# Patient Record
Sex: FEMALE | Race: BLACK OR AFRICAN AMERICAN | NOT HISPANIC OR LATINO | Employment: UNEMPLOYED | URBAN - NONMETROPOLITAN AREA
[De-identification: names, ages, dates, MRNs, and addresses within clinical notes are randomized per-mention and may not be internally consistent; named-entity substitution may affect disease eponyms.]

---

## 2023-10-08 ENCOUNTER — HOSPITAL ENCOUNTER (EMERGENCY)
Facility: HOSPITAL | Age: 23
Discharge: HOME | End: 2023-10-08
Payer: MEDICAID

## 2023-10-08 VITALS
WEIGHT: 117 LBS | BODY MASS INDEX: 20.73 KG/M2 | SYSTOLIC BLOOD PRESSURE: 120 MMHG | HEART RATE: 105 BPM | HEIGHT: 63 IN | TEMPERATURE: 99 F | RESPIRATION RATE: 18 BRPM | OXYGEN SATURATION: 99 % | DIASTOLIC BLOOD PRESSURE: 65 MMHG

## 2023-10-08 DIAGNOSIS — N76.0 BACTERIAL VAGINOSIS: ICD-10-CM

## 2023-10-08 DIAGNOSIS — B96.89 BACTERIAL VAGINOSIS: ICD-10-CM

## 2023-10-08 DIAGNOSIS — N30.01 ACUTE CYSTITIS WITH HEMATURIA: Primary | ICD-10-CM

## 2023-10-08 LAB
BACTERIA #/AREA URNS HPF: ABNORMAL /HPF
BACTERIA VAG QL WET PREP: ABNORMAL /HPF
BILIRUB UR QL STRIP: NEGATIVE
CLARITY UR: ABNORMAL
CLUE CELLS VAG QL WET PREP: ABNORMAL /HPF
COLOR UR: YELLOW
FLUAV AG UPPER RESP QL IA.RAPID: NEGATIVE
FLUBV AG UPPER RESP QL IA.RAPID: NEGATIVE
GLUCOSE UR STRIP-MCNC: NEGATIVE MG/DL
HCG UR QL IA.RAPID: NEGATIVE
KETONES UR STRIP-SCNC: >=80 MG/DL
LEUKOCYTE ESTERASE UR QL STRIP: NEGATIVE
MUCOUS THREADS #/AREA URNS HPF: ABNORMAL /HPF
NITRITE UR QL STRIP: POSITIVE
PH UR STRIP: 6 PH UNITS
PROT UR QL STRIP: 30
RAPID GROUP A STREP: NEGATIVE
RBC # UR STRIP: ABNORMAL /UL
RBC #/AREA URNS HPF: ABNORMAL /HPF
RBC #/AREA VAG WET PREP: ABNORMAL /HPF
SARS-COV-2 RDRP RESP QL NAA+PROBE: NEGATIVE
SP GR UR STRIP: 1.02
SQUAMOUS #/AREA URNS LPF: ABNORMAL /LPF
SQUAMOUS EPITHELIALS WET WOUNT, GENITAL: ABNORMAL /HPF
T VAGINALIS VAG QL WET PREP: ABNORMAL /HPF
UROBILINOGEN UR STRIP-ACNC: 1 MG/DL
WBC #/AREA URNS HPF: ABNORMAL /HPF
WBC CLUMPS WET MOUNT, GENITAL: ABNORMAL /HPF
WBC VAG QL WET PREP: ABNORMAL /HPF
YEAST VAG QL WET PREP: ABNORMAL /HPF

## 2023-10-08 PROCEDURE — 87077 CULTURE AEROBIC IDENTIFY: CPT | Performed by: REGISTERED NURSE

## 2023-10-08 PROCEDURE — 87186 SC STD MICRODIL/AGAR DIL: CPT | Performed by: REGISTERED NURSE

## 2023-10-08 PROCEDURE — 99284 EMERGENCY DEPT VISIT MOD MDM: CPT

## 2023-10-08 PROCEDURE — 81025 URINE PREGNANCY TEST: CPT | Performed by: REGISTERED NURSE

## 2023-10-08 PROCEDURE — 87880 STREP A ASSAY W/OPTIC: CPT | Performed by: REGISTERED NURSE

## 2023-10-08 PROCEDURE — 87804 INFLUENZA ASSAY W/OPTIC: CPT | Performed by: REGISTERED NURSE

## 2023-10-08 PROCEDURE — 99284 EMERGENCY DEPT VISIT MOD MDM: CPT | Mod: ,,, | Performed by: REGISTERED NURSE

## 2023-10-08 PROCEDURE — 87635 SARS-COV-2 COVID-19 AMP PRB: CPT | Performed by: REGISTERED NURSE

## 2023-10-08 PROCEDURE — 87210 SMEAR WET MOUNT SALINE/INK: CPT | Performed by: REGISTERED NURSE

## 2023-10-08 PROCEDURE — 81001 URINALYSIS AUTO W/SCOPE: CPT | Performed by: REGISTERED NURSE

## 2023-10-08 PROCEDURE — 99284 PR EMERGENCY DEPT VISIT,LEVEL IV: ICD-10-PCS | Mod: ,,, | Performed by: REGISTERED NURSE

## 2023-10-08 RX ORDER — CIPROFLOXACIN 500 MG/1
500 TABLET ORAL EVERY 12 HOURS
Qty: 14 TABLET | Refills: 0 | Status: SHIPPED | OUTPATIENT
Start: 2023-10-08 | End: 2023-10-15

## 2023-10-08 RX ORDER — METRONIDAZOLE 500 MG/1
500 TABLET ORAL 3 TIMES DAILY
Qty: 21 TABLET | Refills: 0 | Status: SHIPPED | OUTPATIENT
Start: 2023-10-08 | End: 2023-10-15

## 2023-10-08 NOTE — ED TRIAGE NOTES
Pt presents with c/o chills, RLQ abdominal pain, headache, and states she feels like she has fever. Took dayquil approx. 1 and 1/2 hours prior to arrival.

## 2023-10-08 NOTE — ED PROVIDER NOTES
"Encounter Date: 10/8/2023       History     Chief Complaint   Patient presents with    Chills    Fever    Abdominal Pain    Dysuria    Headache     Chanell Archer is a 22 yo AAF that presents with 3 day history of chills, subjective fever, headache, and right flank pain.  States she began to have RLQ abdominal pain today.  Pt states the flank pain increases with movement.  She denies dysuria or frequency.  Rates the pain as dull in nature with intermittent sharp pain at times 6/10 on pain scale.  Pt states she took DayQuil 1.5 hrs PTA without relief.  Has had alternating nasal congestion and drainage.  Cough with "green" sputum.    The history is provided by the patient.     Review of patient's allergies indicates:  No Known Allergies  History reviewed. No pertinent past medical history.  History reviewed. No pertinent surgical history.  History reviewed. No pertinent family history.  Social History     Tobacco Use    Smoking status: Every Day     Types: Vaping with nicotine    Smokeless tobacco: Never   Substance Use Topics    Alcohol use: Not Currently    Drug use: Not Currently     Review of Systems   Constitutional:  Positive for chills and fever.   HENT:  Positive for congestion and rhinorrhea. Negative for sore throat.    Respiratory:  Positive for cough. Negative for shortness of breath.    Cardiovascular:  Positive for chest pain.   Gastrointestinal:  Positive for abdominal pain (RLQ). Negative for diarrhea, nausea and vomiting.   Genitourinary:  Positive for flank pain and vaginal discharge (white). Negative for dysuria, frequency and urgency.   Neurological:  Positive for headaches.       Physical Exam     Initial Vitals [10/08/23 1901]   BP Pulse Resp Temp SpO2   120/65 105 18 99.4 °F (37.4 °C) 99 %      MAP       --         Physical Exam    Constitutional: She appears well-developed and well-nourished. She is not diaphoretic. She is active and cooperative.  Non-toxic appearance. She does not have a " sickly appearance. She does not appear ill. No distress.   HENT:   Head: Normocephalic and atraumatic.   Right Ear: External ear normal.   Left Ear: External ear normal.   Nose: Nose normal.   Mouth/Throat: Oropharynx is clear and moist. No oropharyngeal exudate.   Eyes: EOM are normal. Pupils are equal, round, and reactive to light.   Neck: Neck supple.   Normal range of motion.  Cardiovascular:  Regular rhythm, normal heart sounds and intact distal pulses.           Pulmonary/Chest: Breath sounds normal. No respiratory distress.   Abdominal: Abdomen is soft. Bowel sounds are normal. She exhibits no distension.   Musculoskeletal:         General: Normal range of motion.      Cervical back: Normal range of motion and neck supple.     Neurological: She is alert and oriented to person, place, and time. She has normal strength. GCS score is 15. GCS eye subscore is 4. GCS verbal subscore is 5. GCS motor subscore is 6.   Skin: Skin is warm and dry. Capillary refill takes less than 2 seconds.   Psychiatric: She has a normal mood and affect. Her behavior is normal. Judgment and thought content normal.         Medical Screening Exam   See Full Note    ED Course   Procedures  Labs Reviewed   WET PREP, GENITAL - Abnormal; Notable for the following components:       Result Value    WBC Wet [place] Few (*)     Bacteria [place] Few (*)     Clue Cell [place] Moderate (*)     Squamous Epithelials [place] Few (*)     All other components within normal limits   URINALYSIS, REFLEX TO URINE CULTURE - Abnormal; Notable for the following components:    Nitrites, UA Positive (*)     Protein, UA 30 (*)     Ketones, UA >=80 (*)     Blood, UA Moderate (*)     All other components within normal limits   URINALYSIS, MICROSCOPIC - Abnormal; Notable for the following components:    WBC, UA Too Numerous To Count (*)     Bacteria, UA Moderate (*)     Squamous Epithelial Cells, UA Few (*)     Mucus, UA Few (*)     All other components within  "normal limits   THROAT SCREEN, RAPID STREP - Normal   RAPID INFLUENZA A/B - Normal   SARS-COV-2 RNA AMPLIFICATION, QUAL - Normal    Narrative:     Negative SARS-CoV results should not be used as the sole basis for treatment or patient management decisions; negative results should be considered in the context of a patient's recent exposures, history and the presene of clinical signs and symptoms consistent with COVID-19.  Negative results should be treated as presumptive and confirmed by molecular assay, if necessary for patient management.   HCG QUALITATIVE URINE - Normal   CULTURE, URINE          Imaging Results    None          Medications - No data to display  Medical Decision Making  Chanell Archer is a 24 yo AAF that presents with 3 day history of chills, subjective fever, headache, and right flank pain.  States she began to have RLQ abdominal pain today.  Pt states the flank pain increases with movement.  She denies dysuria or frequency.  Rates the pain as dull in nature with intermittent sharp pain at times 6/10 on pain scale.  Pt states she took DayQuil 1.5 hrs PTA without relief.  Has had alternating nasal congestion and drainage.  Cough with "green" sputum.    -Discussed lab findings with pt  -Discussed discharge instructions with pt who verbalized understanding and is in agreement with the plan of care.        Amount and/or Complexity of Data Reviewed  Labs: ordered.     Details: Labs Reviewed  WET PREP, GENITAL - Abnormal; Notable for the following components:     WBC Wet Mount                 Few (*)                Bacteria Wet Mount            Few (*)                Clue Cell Wet Mount           Moderate (*)               Squamous Epithelials Wet Mount   Few (*)             All other components within normal limits  URINALYSIS, REFLEX TO URINE CULTURE - Abnormal; Notable for the following components:     Nitrites, UA                  Positive (*)               Protein, UA                   30 (*)       "           Ketones, UA                   >=80 (*)               Blood, UA                     Moderate (*)            All other components within normal limits  URINALYSIS, MICROSCOPIC - Abnormal; Notable for the following components:     WBC, UA                         (*)                  Bacteria, UA                  Moderate (*)               Squamous Epithelial Cells, UA   Few (*)                Mucus, UA                     Few (*)             All other components within normal limits  THROAT SCREEN, RAPID STREP - Normal  RAPID INFLUENZA A/B - Normal  SARS-COV-2 RNA AMPLIFICATION, QUAL - Normal         Narrative: Negative SARS-CoV results should not be used as the sole basis for treatment or patient management decisions; negative results should be considered in the context of a patient's recent exposures, history and the presene of clinical signs and symptoms consistent with COVID-19.  Negative results should be treated as presumptive and confirmed by molecular assay, if necessary for patient management.  HCG QUALITATIVE URINE - Normal  CULTURE, URINE      Risk  Prescription drug management.                               Clinical Impression:   Final diagnoses:  [N76.0, B96.89] Bacterial vaginosis  [N30.01] Acute cystitis with hematuria (Primary)        ED Disposition Condition    Discharge Stable          ED Prescriptions       Medication Sig Dispense Start Date End Date Auth. Provider    metroNIDAZOLE (FLAGYL) 500 MG tablet Take 1 tablet (500 mg total) by mouth 3 (three) times daily. for 7 days 21 tablet 10/8/2023 10/15/2023 Maryam Honeycutt NP-C    ciprofloxacin HCl (CIPRO) 500 MG tablet Take 1 tablet (500 mg total) by mouth every 12 (twelve) hours. for 7 days 14 tablet 10/8/2023 10/15/2023 Maryam Honeycutt NP-C          Follow-up Information       Follow up With Specialties Details Why Contact Info    Parrish Sarah Jr., NP Critical Care Medicine In 2 days If symptoms worsen or are not improving [facility]  [address] [place] [address]  [phone]      Parrish Sarah Jr., NP Critical Care Medicine  At the completion of your antibiotics to have urine rechecked [address] [place] [address]  [phone]               Maryam Honeycutt, NP-C  10/09/23 0147

## 2023-10-09 NOTE — DISCHARGE INSTRUCTIONS
-Do not drink alcohol while taking Flagyl  -Medication as directed  -Increase water intake  -Follow up with your regular provider at the completion of your antibiotics to have your urine rechecked  -Return to ED as needed

## 2023-10-11 LAB — UA COMPLETE W REFLEX CULTURE PNL UR: ABNORMAL

## 2023-10-16 ENCOUNTER — TELEPHONE (OUTPATIENT)
Dept: EMERGENCY MEDICINE | Facility: HOSPITAL | Age: 23
End: 2023-10-16
Payer: MEDICAID

## 2023-10-16 NOTE — TELEPHONE ENCOUNTER
SPOKE WITH PT MOTHER AND MADE HER AWARE TO COMPLETE ALL MEDS AND FU WITH PCP TO RECHECK URINE.     ----- Message from IVELISSE Cash sent at 10/15/2023 10:44 PM CDT -----  Urine culture grew out e-coli which cipro will cover. Complete Cipro as directed. Follow up with PCP after antibiotic is completed to make sure UTI has resolved.

## 2024-02-04 ENCOUNTER — HOSPITAL ENCOUNTER (EMERGENCY)
Facility: HOSPITAL | Age: 24
Discharge: HOME | End: 2024-02-04

## 2024-02-04 VITALS
OXYGEN SATURATION: 99 % | RESPIRATION RATE: 18 BRPM | DIASTOLIC BLOOD PRESSURE: 72 MMHG | HEART RATE: 89 BPM | WEIGHT: 115 LBS | SYSTOLIC BLOOD PRESSURE: 106 MMHG | TEMPERATURE: 98 F | BODY MASS INDEX: 21.16 KG/M2 | HEIGHT: 62 IN

## 2024-02-04 DIAGNOSIS — S60.10XA SUBUNGUAL HEMATOMA OF DIGIT OF HAND, INITIAL ENCOUNTER: Primary | ICD-10-CM

## 2024-02-04 PROCEDURE — 11740 EVACUATION SUBUNGUAL HMTMA: CPT

## 2024-02-04 PROCEDURE — 25000003 PHARM REV CODE 250: Performed by: REGISTERED NURSE

## 2024-02-04 PROCEDURE — 99283 EMERGENCY DEPT VISIT LOW MDM: CPT | Mod: 25,,, | Performed by: REGISTERED NURSE

## 2024-02-04 PROCEDURE — 11740 EVACUATION SUBUNGUAL HMTMA: CPT | Mod: ,,, | Performed by: REGISTERED NURSE

## 2024-02-04 PROCEDURE — 99283 EMERGENCY DEPT VISIT LOW MDM: CPT | Mod: 25

## 2024-02-04 RX ORDER — ACETAMINOPHEN 325 MG/1
650 TABLET ORAL
Status: COMPLETED | OUTPATIENT
Start: 2024-02-04 | End: 2024-02-04

## 2024-02-04 RX ADMIN — ACETAMINOPHEN 650 MG: 325 TABLET ORAL at 05:02

## 2024-02-04 NOTE — DISCHARGE INSTRUCTIONS
-Warm salt water soaks with gentle expression of blood beneath nail  -Follow up with your regular provider if right thumb begins to turn red, hot, has increased swelling, there is pus like drainage coming from hole created, or you begin to run fever.  -Tylenol every 4 hours as needed for pain and discomfort.  Do not use ibuprofen or other NSAID.

## 2024-02-04 NOTE — ED PROVIDER NOTES
Encounter Date: 2/4/2024       History     Chief Complaint   Patient presents with    Hand Injury     Pt states she slammed her right hand in a car door and now c/o level 9, throbbing pain in her right thumb. Pt reports that she is 5 weeks pregnant.      Chanell Archer is a 22 yo AAF 5 weeks gestation that presents with c/o right thumb pain since approx 2100 last pm after shutting her finger in car door.  States she has not taken anything for pain.  States pain is aching and throbbing 9/10 in nature and constant.      The history is provided by the patient.     Review of patient's allergies indicates:  No Known Allergies  No past medical history on file.  No past surgical history on file.  No family history on file.  Social History     Tobacco Use    Smoking status: Every Day     Types: Vaping with nicotine    Smokeless tobacco: Never   Substance Use Topics    Alcohol use: Not Currently    Drug use: Not Currently     Review of Systems   Constitutional: Negative.    HENT: Negative.     Eyes: Negative.    Respiratory: Negative.     Cardiovascular: Negative.    Gastrointestinal: Negative.    Genitourinary: Negative.    Musculoskeletal:  Positive for joint swelling.   Skin:  Positive for color change. Negative for wound.   Neurological: Negative.    Psychiatric/Behavioral: Negative.         Physical Exam     Initial Vitals [02/04/24 0405]   BP Pulse Resp Temp SpO2   106/72 89 18 98.1 °F (36.7 °C) 99 %      MAP       --         Physical Exam    Constitutional: She appears well-developed and well-nourished. She is not diaphoretic. She is active.  Non-toxic appearance. She does not have a sickly appearance. She does not appear ill. No distress.   HENT:   Head: Normocephalic and atraumatic.   Right Ear: External ear normal.   Left Ear: External ear normal.   Nose: Nose normal.   Eyes: EOM are normal. Pupils are equal, round, and reactive to light.   Neck: Neck supple.   Normal range of motion.  Cardiovascular:  Normal rate,  regular rhythm, normal heart sounds and intact distal pulses.           Pulmonary/Chest: Breath sounds normal. No respiratory distress.   Abdominal: Abdomen is soft. Bowel sounds are normal.   Musculoskeletal:         General: Tenderness present.      Right hand: Tenderness present. No deformity or lacerations. Normal capillary refill. Normal pulse.      Left hand: Normal.        Hands:       Cervical back: Normal range of motion and neck supple.      Comments: No decrease cap refill at tip     Neurological: She is alert and oriented to person, place, and time. She has normal strength. No sensory deficit. GCS score is 15. GCS eye subscore is 4. GCS verbal subscore is 5. GCS motor subscore is 6.   Skin: Skin is warm and dry. Capillary refill takes less than 2 seconds.   Psychiatric: She has a normal mood and affect. Her behavior is normal. Judgment and thought content normal.         Medical Screening Exam   See Full Note    ED Course   Procedures  Labs Reviewed - No data to display       Imaging Results    None          Medications   acetaminophen tablet 650 mg (650 mg Oral Given 2/4/24 0523)     Medical Decision Making  Chanell Archer is a 22 yo AAF 5 weeks gestation that presents with c/o right thumb pain since approx 2100 last pm after shutting her finger in car door.  States she has not taken anything for pain.  States pain is aching and throbbing 9/10 in nature and constant.      -Pt is 5 weeks gestation per LMP 12-28-23 and home pregnancy test. No radiology exam to be performed  -18ga needle used to create hole in right thumbnail.  Dark red blood running from hole and on expression.  Pt states improvement of pain now 6/10.    -Discussed in detail discharge instructions with pt who verbalized understanding and is in agreement with the plan of care.    Risk  OTC drugs.                                      Clinical Impression:   Final diagnoses:  [S60.10XA] Subungual hematoma of digit of hand, initial encounter  (Primary)        ED Disposition Condition    Discharge Stable          ED Prescriptions    None       Follow-up Information       Follow up With Specialties Details Why Contact Info    Parrish Sarah Jr., NP Critical Care Medicine In 2 days If symptoms worsen [address] [address]  [phone]               Maryam Honeycutt, NP-C  02/04/24 0716

## 2024-02-05 ENCOUNTER — TELEPHONE (OUTPATIENT)
Dept: EMERGENCY MEDICINE | Facility: HOSPITAL | Age: 24
End: 2024-02-05
Payer: MEDICAID